# Patient Record
Sex: FEMALE | Race: BLACK OR AFRICAN AMERICAN | NOT HISPANIC OR LATINO | URBAN - NONMETROPOLITAN AREA
[De-identification: names, ages, dates, MRNs, and addresses within clinical notes are randomized per-mention and may not be internally consistent; named-entity substitution may affect disease eponyms.]

---

## 2022-02-21 PROBLEM — Z12.39 BREAST CANCER SCREENING, HIGH RISK PATIENT: Chronic | Status: CHRONIC | Noted: 2022-02-21

## 2023-04-17 ENCOUNTER — ON CAMPUS - OUTPATIENT (OUTPATIENT)
Dept: URBAN - NONMETROPOLITAN AREA HOSPITAL 34 | Facility: HOSPITAL | Age: 68
End: 2023-04-17

## 2023-04-17 DIAGNOSIS — K31.89 OTHER DISEASES OF STOMACH AND DUODENUM: ICD-10-CM

## 2023-04-17 DIAGNOSIS — K55.20 ANGIODYSPLASIA OF COLON WITHOUT HEMORRHAGE: ICD-10-CM

## 2023-04-17 PROCEDURE — 43239 EGD BIOPSY SINGLE/MULTIPLE: CPT | Mod: 59 | Performed by: INTERNAL MEDICINE

## 2023-04-17 PROCEDURE — 43270 EGD LESION ABLATION: CPT | Performed by: INTERNAL MEDICINE

## 2023-07-20 ENCOUNTER — OFFICE (OUTPATIENT)
Dept: URBAN - NONMETROPOLITAN AREA CLINIC 1 | Facility: CLINIC | Age: 68
End: 2023-07-20
Payer: COMMERCIAL

## 2023-07-20 VITALS
SYSTOLIC BLOOD PRESSURE: 151 MMHG | HEIGHT: 64 IN | HEART RATE: 93 BPM | WEIGHT: 123 LBS | SYSTOLIC BLOOD PRESSURE: 149 MMHG | DIASTOLIC BLOOD PRESSURE: 102 MMHG

## 2023-07-20 DIAGNOSIS — D50.9 IRON DEFICIENCY ANEMIA, UNSPECIFIED: ICD-10-CM

## 2023-07-20 DIAGNOSIS — K27.9 PEPTIC ULCER, SITE UNSPECIFIED, UNSPECIFIED AS ACUTE OR CHRO: ICD-10-CM

## 2023-07-20 DIAGNOSIS — K31.819 ANGIODYSPLASIA OF STOMACH AND DUODENUM WITHOUT BLEEDING: ICD-10-CM

## 2023-07-20 PROCEDURE — 99214 OFFICE O/P EST MOD 30 MIN: CPT | Performed by: NURSE PRACTITIONER

## 2023-07-20 NOTE — SERVICENOTES
Of note, patient's blood pressure is elevated today in clinic.  She took her blood pressure medications while in the exam room, as she had not yet taken them this morning.  Patient was advised to recheck her blood pressure in 1 hour to ensure that it is coming down.  She denies any cardiopulmonary symptoms.  She does have a pronounced murmur on exam.  Feels that she has had an echocardiogram.  Recommend follow-up with PCP to ensure she is up-to-date on this.

## 2023-07-20 NOTE — SERVICEHPINOTES
Pleasant 67-year-old female presents for a follow-up. She last saw Dr. Caceres in the clinic where GERD, nausea, vomiting, abdominal pain.  She also had a positive Hemoccult, with a family history of colon cancer, therefore proceeded with endoscopy and colonoscopy for further evaluation.  Initially, her colonoscopy revealed 2 small adenomas, melanosis coli, hemorrhoids. Her endoscopy revealed a large clean-based gastric ulcer. Biopsies were negative for H. pylori. She denies any NSAID use. 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 Her gastric ulcer was healing.  She was to repeat endoscopy in 2 to 3 months.. 
br
br
At present, patient is taking her Protonix twice daily, and sucralfate 2-4 times per day.  Patient denies any abdominal pain, diarrhea, constipation, rectal bleeding or melena. Denies heartburn, dysphagia, nausea or vomiting. No changes in appetite or unintentional weight loss.

## 2023-07-21 LAB
ANEMIA PROFILE B: BASO (ABSOLUTE): 0.1 X10E3/UL (ref 0–0.2)
ANEMIA PROFILE B: BASOS: 1 %
ANEMIA PROFILE B: EOS (ABSOLUTE): 0.2 X10E3/UL (ref 0–0.4)
ANEMIA PROFILE B: EOS: 2 %
ANEMIA PROFILE B: FERRITIN: 19 NG/ML (ref 15–150)
ANEMIA PROFILE B: FOLATE (FOLIC ACID), SERUM: >20 NG/ML
ANEMIA PROFILE B: HEMATOCRIT: 37.8 % (ref 34–46.6)
ANEMIA PROFILE B: HEMATOLOGY COMMENTS: (no result)
ANEMIA PROFILE B: HEMOGLOBIN: 12.4 G/DL (ref 11.1–15.9)
ANEMIA PROFILE B: IMMATURE CELLS: (no result)
ANEMIA PROFILE B: IMMATURE GRANS (ABS): 0 X10E3/UL (ref 0–0.1)
ANEMIA PROFILE B: IMMATURE GRANULOCYTES: 0 %
ANEMIA PROFILE B: IRON BIND.CAP.(TIBC): 412 UG/DL (ref 250–450)
ANEMIA PROFILE B: IRON SATURATION: 17 % (ref 15–55)
ANEMIA PROFILE B: IRON: 69 UG/DL (ref 27–139)
ANEMIA PROFILE B: LYMPHS (ABSOLUTE): 1.8 X10E3/UL (ref 0.7–3.1)
ANEMIA PROFILE B: LYMPHS: 22 %
ANEMIA PROFILE B: MCH: 33.6 PG — HIGH (ref 26.6–33)
ANEMIA PROFILE B: MCHC: 32.8 G/DL (ref 31.5–35.7)
ANEMIA PROFILE B: MCV: 102 FL — HIGH (ref 79–97)
ANEMIA PROFILE B: MONOCYTES(ABSOLUTE): 0.8 X10E3/UL (ref 0.1–0.9)
ANEMIA PROFILE B: MONOCYTES: 9 %
ANEMIA PROFILE B: NEUTROPHILS (ABSOLUTE): 5.5 X10E3/UL (ref 1.4–7)
ANEMIA PROFILE B: NEUTROPHILS: 66 %
ANEMIA PROFILE B: NRBC: (no result)
ANEMIA PROFILE B: PLATELETS: 297 X10E3/UL (ref 150–450)
ANEMIA PROFILE B: RBC: 3.69 X10E6/UL — LOW (ref 3.77–5.28)
ANEMIA PROFILE B: RDW: 13.3 % (ref 11.7–15.4)
ANEMIA PROFILE B: RETICULOCYTE COUNT: 2.2 % (ref 0.6–2.6)
ANEMIA PROFILE B: UIBC: 343 UG/DL (ref 118–369)
ANEMIA PROFILE B: VITAMIN B12: 1342 PG/ML — HIGH (ref 232–1245)
ANEMIA PROFILE B: WBC: 8.2 X10E3/UL (ref 3.4–10.8)

## 2023-08-29 ENCOUNTER — ON CAMPUS - OUTPATIENT (OUTPATIENT)
Dept: URBAN - NONMETROPOLITAN AREA HOSPITAL 34 | Facility: HOSPITAL | Age: 68
End: 2023-08-29
Payer: COMMERCIAL

## 2023-08-29 DIAGNOSIS — K26.9 DUODENAL ULCER, UNSPECIFIED AS ACUTE OR CHRONIC, WITHOUT HEM: ICD-10-CM

## 2023-08-29 PROCEDURE — 43235 EGD DIAGNOSTIC BRUSH WASH: CPT | Performed by: INTERNAL MEDICINE
